# Patient Record
(demographics unavailable — no encounter records)

---

## 2024-10-16 NOTE — ASSESSMENT
[FreeTextEntry1] :  52-year-old female with past medical history of primary hyperparathyroidism status post parathyroidectomy 2023, osteoporosis (following endocrine), cervical and lumbar spine degenerative arthritis, cervical radiculopathy    Patient reports has been seeing ortho for low back pain and neck pain.  She reports since Summer 2024 she has been having pain in her hands and knees She also has pain in elbows, shoulders, toes   Both knees hurting ; worse with getting up and going down stairs  She has morning stiffness 30 min in hand knees, back  She has IBS- diarrhea and constipation  She has some canker sores on and off  She reports she had hives as a child; now its rare - once a year.  Reports dry eyes and dry mouth recently  Denies Raynaud's, joint inflammation, telangiectasias, skin thickening, dysphagia, calcinosis    Patient's work up shows  labs with +LEVON remaining serologes negative, and inflammation markers normal   Her hand u/s showed Trace scattered joint effusions on the right. Moderate midcarpal effusion with mild hyperemia. Findings are suggestive of mild inflammatory arthropathy. Mild to moderate tenosynovitis involving the extensor compartments bilaterally without hyperemia.   She has lumbar and cervical degenerative disease which explains her neck and back pain.  Given +LEVON, hand imaging showing mild inflammatory arthropathy, she might have undifferentiated connective tissue at this time Explained the LEVON centromere serology to patient in detail. At this point, she has no signs of limited systemic sclerosis, she had normal echo in 2023 per patient. Advised on symptoms to monitor for Meanwhile, will do trial of low-dose prednisone given mild inflammatory arthropathy seen on her hand ultrasound Prednisone 20 mg for 7 days followed by 50 mg for 7 days followed by 10 mg for 7 days then stop She will monitor her symptoms on prednisone and off the prednisone Further treatment based on symptoms after prednisone trial Follow up 3 month      Total time spent in review of patient history, clinical exam, management, counseling, and plan of care:  30min

## 2024-10-16 NOTE — HISTORY OF PRESENT ILLNESS
[FreeTextEntry1] : 52-year-old female with past medical history of primary hyperparathyroidism status post parathyroidectomy 2023, osteoporosis (following endocrine), cervical and lumbar spine degenerative arthritis      Coming in for evaluation of joint pain   Seen 9/2024; initial visit note:  Patient reports has been seeing ortho for low back pain and neck pain. She also has cervical radiculopathy  She reports for last 3-4 months has been having pain in her hands and knees   She also has pain in elbows, shoulders, toes but the worse is hands and knees  Before that she had mild on and off pain  Now difficulty opening jars, even toothpaste cap - worse with motion  Both knees hurting ; worse with getting up and going down stairs  She has morning stiffness 30 min in hand knees, back  She has IBS- diarrhea and constipation  She has some canker sores on and off  She reports she had hives as a child; now its rare - once a year.  Reports dry eyes and dry mouth recently  She is undergoing menopause.   Patient denies joint swelling, joint erythema/warmth, fatigue, fever, chills, weight loss, nasopharyngeal ulcers, chest pain, palpitations, cough, SOB, nausea, vomiting, blood in stool, dysuria, hematuria, rash, photosensitivity, Raynaud's, alopecia, dry eyes, dry mouth, dry skin, headaches, eye pain/redness, vision changes, myalgias, muscle weakness, dysphagia, miscarriages (2 early miscarriages), Hx of DVT/PEs.   Denies tophi, personal Hx of nephrolithiasis  Denies skin thickening ,severe heartburn, calcinosis, sinusitis.   Work up showed labs with +LEVON centromere pattern remaining serologies negative, and inflammation markers normal   Her hand u/s showed Trace scattered joint effusions on the right. Moderate midcarpal effusion with mild hyperemia. Findings are suggestive of mild inflammatory arthropathy. Mild to moderate tenosynovitis involving the extensor compartments bilaterally without hyperemia.      9/13/2024   LEVON 1: 320 centromere pattern  Normal/negative Palm, RNP, dsDNA, RF, CCP, C3, C4, RF, CCP, protein creatinine ratio urine\       Imaging   Xrays hand/wrist   IMPRESSION:   No fractures or dislocations.  Preserved joint spaces and no joint margin erosions.  Carpal bones normally aligned.  Bone islands in left 4th and 5th distal phalangeal tuft hips. No additional focal osseous lesions.       Xr knee   IMPRESSION:  No fractures, dislocations, or joint effusions.  Preserved joint spaces with smooth and intact articular surfaces. No joint margin erosions or intra-articular or periarticular calcifications.  Unremarkable quadriceps and patellar tendon shadows.  No destructive osseous lesions or periosteal reaction.           Hand u/s   IMPRESSION:   1.  Trace scattered joint effusions on the right. Moderate midcarpal effusion with mild hyperemia. Findings are suggestive of mild inflammatory arthropathy.   2.  Mild to moderate tenosynovitis involving the extensor compartments bilaterally without hyperemia.

## 2024-10-16 NOTE — PHYSICAL EXAM
[TextEntry] :     GENERAL: Appears in no acute distress HEENT: EOMI. No conjunctival erythema. Moist mucous membranes. NECK: Supple, no cervical lymphadenopathy CARDIOVASCULAR: RRR. S1, S2 auscultated. No murmur PULMONARY: Clear to auscultation b/l, no wheezes, ABDOMINAL: Soft, nontender, nondistended. MSK: No active synovitis, swelling, erythema, or warmth. No joint tenderness to palpation. No Bouchards or Heberdens nodes No deformities. Normal ROM of neck, back, b/l upper and lower extremities. No dactylitis, enthesitis, nail pitting SKIN: No lesions or rashes No sclerodactyly, telangiectasias, calcinosis. NEURO: No focal deficits. Motor strength 5/5 in major muscle groups of b/l UE and LE. Sensation to soft touch intact in major dermatomes of b/l UE and LE. PSYCH: AAOx3. Normal affect and thought process.

## 2025-01-15 NOTE — ASSESSMENT
[FreeTextEntry1] : Labs from 01/26/2023 indicated the patient had primary hyperparathyroidism - she underwent right superior parathyroidectomy on 04/12/2024 and she has been eucalcemic since then.  DXA scan in Feb 2023 revealed osteoporosis and she underwent parathyroidectomy in April 2023 - repeat DXA in May 2024 revealed significant improvement of the BMD at the Lumbar Spine (6% improvement) and at the left total hip (7% improvement) and no significant change in BMD at the left femoral neck but the patient still remained osteoporotic. Thus I prescribed alendronate on 07/18/2024 and patient started this in August 2024.    Plan: 1. Continue alendronate 70 mg po once weekly 2. Labs to be done in 1 year - CMP, PTH, 25 OH vitamin D, phosphorous level 3. Follow up in 1 year to review results - telehealth visit ok - the patient is a nurse at [employer].

## 2025-01-15 NOTE — PHYSICAL EXAM
[de-identified] : General: No distress, well nourished Eyes: Normal external appearance ENT: Normal appearance of the nose Neck/Thyroid: No visible neck swelling Respiratory: No use of accessory muscles of respiration Psychiatry: Patient converses normally, good judgement and insight Skin: No rashes seen on face

## 2025-01-15 NOTE — HISTORY OF PRESENT ILLNESS
[FreeTextEntry1] : Problems: 1. History of Primary hyperparathyroidism s/p parathyroidectomy in 2023 - patient eucalcemic now 2. Osteoporosis    History Primary hyperparathyroidism/Osteoporosis 1. Diagnosed with elevated calcium level in October 2022 and primary hyperparathyroidism in Jan 2023 2. Labs: 10/18/2021 - Calcium 10.8 (8.4 to 10.5), albumin 4.7, 25 OH vitamin D level 24.6, TSH normal 01/14/2023 - PTH 60 (15 to 65) 01/26/2023 - PTH 54 (15 to 65), 25 OH vitamin D 26.4, Cr 0.7 N, Calcium 11.2 (8.4 to 10.5), albumin 4.6, corrected calcium 10.72,  June 2023 - PTH N, 25 OH vitamin D - 34.5 November 2023 - Cr N, corrected calcium N June 2024 - 25 OH vitamin D 41.9, SPEP normal, serum phosphorous N, PTH 43 N, Cr N, Corrected calcium N 3. Radiology: 02/07/2023 - US thyroid - Right lobe measures 5.3 cm - there is a 0.4 cm lesion deep to the right lower pole and a 0.3 cm nodule deep to the lower left lobe.  02/07/2023 - DXA scan - L1 to L4  T score neg 3.4 with BMD of 0.672, Left total Hip T score neg 2.7 with BMD of 0.607, left femoral neck T score neg 2.2 with BMD of 0.604.  02/14/2023 - NM parathyroid scan - Parathyroid lesions seen in the right lower pole and left lower pole, there is possibly a right upper pole parathyroid lesion.  03/17/2023 - CT 4 D parathyroid - Parathyroid adenoma posterior to the right mid to lower thyroid lobe. 05/22/2024 - DXA - L1 to L4 BMD 0.711 with T score of neg 3.1, Left femoral neck BMD 0.601 with T score neg 2.2, Left total hip BMD 0.646 with T score neg 2.4, Single lateral view of the thoracolumbar spine (T10-L4)  demonstrates no evidence of vertebral compression deformity. 4. Surgical treatment: 04/12/2023 - right superior parathyroidectomy - pathology benign - patient became eucalcemic post operatively and PTH was normal 5. No kidney stones, Creatinine normal in October 2022, has osteoporosis (DXA scan in Feb 2023 revealed osteoporosis, patient never had a fracture - the patient still menstruates) 5. No family history of hyperparathyroidism or hypercalcemia 6. Patient sporadically drinks milk, she does not use yogurt, ice-cream or cheese On combination of calcium/vitamin D pill each pill contains 600 mg of calcium and 1000 units - uses one pill po bid.  7. Meds: Patient did not have treatment for osteoporosis prior to August 2024 NO UPCOMING DENTAL WORK PLANNED On alendronate 70 mg po once weekly - no side effects, started in August 2024

## 2025-01-15 NOTE — REASON FOR VISIT
[Other Location: e.g. School (Enter Location, City,State)___] : at [unfilled], at the time of the visit. [Medical Office: ([facility])___] : at the medical office located in  [Patient] : the patient [Follow - Up] : a follow-up visit [Osteoporosis] : osteoporosis

## 2025-01-16 NOTE — HISTORY OF PRESENT ILLNESS
[FreeTextEntry1] : 53-year-old female with past medical history of primary hyperparathyroidism status post parathyroidectomy 2023, osteoporosis (following endocrine), cervical and lumbar spine degenerative arthritis      Coming in for for follow up   Seen 9/2024; initial visit note:  Patient reports has been seeing ortho for low back pain and neck pain. She also has cervical radiculopathy  She reports  pain in her hands and knees. She also has pain in elbows, shoulders, toes but the worse is hands and knees  Now difficulty opening jars, even toothpaste cap - worse with motion  Both knees hurting ; worse with getting up and going down stairs  She has morning stiffness 30 min in hand knees, back  She has IBS- diarrhea and constipation  She has some canker sores on and off  She reports she had hives as a child; now its rare - once a year.  Reports dry eyes and dry mouth recently  She is undergoing menopause.   Patient denies joint swelling, joint erythema/warmth, fatigue, fever, chills, weight loss, nasopharyngeal ulcers, chest pain, palpitations, cough, SOB, nausea, vomiting, blood in stool, dysuria, hematuria, rash, photosensitivity, Raynaud's, alopecia, dry eyes, dry mouth, dry skin, headaches, eye pain/redness, vision changes, myalgias, muscle weakness, dysphagia, miscarriages (2 early miscarriages), Hx of DVT/PEs.   Denies tophi, personal Hx of nephrolithiasis  Denies skin thickening ,severe heartburn, calcinosis, sinusitis.    Work up showed labs with +LEVON centromere pattern remaining serologies negative, and inflammation markers normal  Her hand u/s showed Trace scattered joint effusions on the right. Moderate midcarpal effusion with mild hyperemia. Findings are suggestive of mild inflammatory arthropathy. Mild to moderate tenosynovitis involving the extensor compartments bilaterally without hyperemia.   Last visit: Did trial of prednisone which helped symptoms Plan was to monitor symptoms - continues to have on and off hand and knee pain  Denies Raynaud's, sclerodactyly, telangiectasia, dysphagia, calcinosis, rashes, shortness of breath, chest pain, fever, chills, cough   9/13/2024   LEVON 1: 320 centromere pattern  Normal/negative Palm, RNP, dsDNA, RF, CCP, C3, C4, RF, CCP, protein creatinine ratio urine      Imaging   Xrays hand/wrist  IMPRESSION:  No fractures or dislocations.  Preserved joint spaces and no joint margin erosions.  Carpal bones normally aligned.  Bone islands in left 4th and 5th distal phalangeal tuft hips. No additional focal osseous lesions.     Xr knee  IMPRESSION:  No fractures, dislocations, or joint effusions.  Preserved joint spaces with smooth and intact articular surfaces. No joint margin erosions or intra-articular or periarticular calcifications.  Unremarkable quadriceps and patellar tendon shadows.  No destructive osseous lesions or periosteal reaction.          Hand u/s  IMPRESSION:  1. Trace scattered joint effusions on the right. Moderate midcarpal effusion with mild hyperemia. Findings are suggestive of mild inflammatory arthropathy.  2. Mild to moderate tenosynovitis involving the extensor compartments bilaterally without hyperemia.

## 2025-01-16 NOTE — ASSESSMENT
[FreeTextEntry1] :  53-year-old female with past medical history of primary hyperparathyroidism status post parathyroidectomy 2023, osteoporosis (following endocrine), cervical and lumbar spine degenerative arthritis, cervical radiculopathy   Patient reports has been seeing ortho for low back pain and neck pain.  She reports since Summer 2024 she has been having pain in her hands and knees  She also has pain in elbows, shoulders, toes  Both knees hurting ; worse with getting up and going down stairs  She has morning stiffness 30 min in hand knees, back  She has IBS- diarrhea and constipation  She has some canker sores on and off  She reports she had hives as a child; now its rare - once a year.  Reports dry eyes and dry mouth recently  Denies Raynaud's, joint inflammation, telangiectasias, skin thickening, dysphagia, calcinosis   Patient's work up shows labs with +LEVON remaining serologies negative, and inflammation markers normal  Her hand u/s showed Trace scattered joint effusions on the right. Moderate midcarpal effusion with mild hyperemia. Findings are suggestive of mild inflammatory arthropathy. Mild to moderate tenosynovitis involving the extensor compartments bilaterally without hyperemia.   She has lumbar and cervical degenerative disease which explains her neck and back pain.   Given +LEVON, hand imaging showing mild inflammatory arthropathy, she might have undifferentiated connective tissue vs seronegative RA. Explained the LEVON centromere serology to patient in detail. No features of CREST. She reports she had an echo a few years ago. Advised getting repeat echo and doing PFTs.  Given inflammatory findings on imaging, will do trial of HCQ.  Patient agrees to this.  HCQ dosage is <5mg/kg/day or <400mg/day to minimize risk of retinal toxicity. 200mg daily Side effects of HCQ were discussed with the patient including but not limited to GI upset, retinal toxicity, QT prolongation. Discussed the importance of yearly ophthalmology evaluation. Labs now  Follow up 3 month  Total time spent in review of patient history, clinical exam, management, counseling, and plan of care:  30min

## 2025-01-16 NOTE — PHYSICAL EXAM
[TextEntry] : GENERAL: Appears in no acute distress HEENT: EOMI. No conjunctival erythema. Moist mucous membranes. NECK: Supple, no cervical lymphadenopathy CARDIOVASCULAR: RRR. S1, S2 auscultated. PULMONARY: Clear to auscultation b/l, no wheezes, ABDOMINAL: Soft, nontender, nondistended. MSK: No active synovitis, swelling, erythema, or warmth. No joint tenderness to palpation. No Bouchards or Heberdens nodes No deformities. Normal ROM of neck, back, b/l upper and lower extremities. No dactylitis, enthesitis, nail pitting SKIN: No lesions or rashes No sclerodactyly, telangiectasias, calcinosis. NEURO: No focal deficits. Motor strength 5/5 in major muscle groups of b/l UE and LE. Sensation to soft touch intact in major dermatomes of b/l UE and LE. PSYCH: AAOx3. Normal affect and thought process

## 2025-02-08 NOTE — HISTORY OF PRESENT ILLNESS
[Patient reported mammogram was normal] : Patient reported mammogram was normal [Patient reported PAP Smear was normal] : Patient reported PAP Smear was normal [Patient reported breast sonogram was normal] : Patient reported breast sonogram was normal [Patient reported bone density results were abnormal] : Patient reported bone density results were abnormal [N] : Patient does not use contraception [Y] : Patient is sexually active [No] : Patient does not have concerns regarding sex [TextBox_4] : 54 yo presents for annual  [Mammogramdate] : 4/26/24 [BreastSonogramDate] : 4/26/24 [PapSmeardate] : 1/22/24 [BoneDensityDate] : 5/22/24 [TextBox_37] : osteoporosis  [LMPDate] : 1/2023 [FreeTextEntry1] : 1/2023

## 2025-02-08 NOTE — PHYSICAL EXAM
[Chaperone Present] : A chaperone was present in the examining room during all aspects of the physical examination [Appropriately responsive] : appropriately responsive [Alert] : alert [No Acute Distress] : no acute distress [No Lymphadenopathy] : no lymphadenopathy [Oriented x3] : oriented x3 [Examination Of The Breasts] : a normal appearance [No Masses] : no breast masses were palpable [Labia Majora] : normal [Labia Minora] : normal [Normal] : normal [Uterine Adnexae] : normal [FreeTextEntry2] : SARA Reilly

## 2025-02-08 NOTE — DISCUSSION/SUMMARY
[FreeTextEntry1] :  GYN Annual evaluation today -pap smear sent TVS done today We discussed - Patient verbalized understanding of all explanations, and her questions were answered, and all concerns were addressed. Patient was screened for depression - no signs of clinical depression. PHQ-2 on file Rx given for mammogram and B sonogram RTO in 1 year for annual   I, Char osuna acting as scribe for Dr. Young. 02/03/2025

## 2025-02-08 NOTE — HISTORY OF PRESENT ILLNESS
[Patient reported mammogram was normal] : Patient reported mammogram was normal [Patient reported PAP Smear was normal] : Patient reported PAP Smear was normal [Patient reported breast sonogram was normal] : Patient reported breast sonogram was normal [Patient reported bone density results were abnormal] : Patient reported bone density results were abnormal [N] : Patient does not use contraception [Y] : Patient is sexually active [No] : Patient does not have concerns regarding sex [TextBox_4] : 52 yo presents for annual  [Mammogramdate] : 4/26/24 [BreastSonogramDate] : 4/26/24 [PapSmeardate] : 1/22/24 [BoneDensityDate] : 5/22/24 [TextBox_37] : osteoporosis  [LMPDate] : 1/2023 [FreeTextEntry1] : 1/2023

## 2025-04-23 NOTE — PHYSICAL EXAM
[No Acute Distress] : no acute distress [Well Nourished] : well nourished [PERRL] : pupils equal round and reactive to light [Normal Oropharynx] : the oropharynx was normal [No Lymphadenopathy] : no lymphadenopathy [Supple] : supple [Thyroid Normal, No Nodules] : the thyroid was normal and there were no nodules present [No Accessory Muscle Use] : no accessory muscle use [Clear to Auscultation] : lungs were clear to auscultation bilaterally [Regular Rhythm] : with a regular rhythm [Normal S1, S2] : normal S1 and S2 [No Murmur] : no murmur heard [No Edema] : there was no peripheral edema [Normal Appearance] : normal in appearance [No Nipple Discharge] : no nipple discharge [No Axillary Lymphadenopathy] : no axillary lymphadenopathy [Soft] : abdomen soft [Non Tender] : non-tender [Non-distended] : non-distended [No Masses] : no abdominal mass palpated [No HSM] : no HSM [Normal Bowel Sounds] : normal bowel sounds [Normal Supraclavicular Nodes] : no supraclavicular lymphadenopathy [Normal Axillary Nodes] : no axillary lymphadenopathy [Normal Posterior Cervical Nodes] : no posterior cervical lymphadenopathy [Normal Anterior Cervical Nodes] : no anterior cervical lymphadenopathy [Normal Affect] : the affect was normal [Normal Insight/Judgement] : insight and judgment were intact [de-identified] : b/l impacted cerumen

## 2025-04-23 NOTE — REVIEW OF SYSTEMS
[Negative] : Neurological [Nausea] : no nausea [Constipation] : no constipation [Diarrhea] : diarrhea [Vomiting] : no vomiting [Heartburn] : no heartburn [Melena] : no melena [FreeTextEntry9] : developed joint pain for which she saw rheum

## 2025-04-23 NOTE — ASSESSMENT
[Vaccines Reviewed] : Immunizations reviewed today. Please see immunization details in the vaccine log within the immunization flowsheet.  [FreeTextEntry1] : 53F c  history of primary hyperparathyroidism (1/2023) s/p R. superior parathyroidectomy , osteoporosis, GERD (EGD 2019), probable IBS on hyoscyamine prn, seronegative arthritis (2024) here for cpe   [facility] - check fasting bw   ecg performed during wellness exam to monitor for any cardiac condition - ecg nsr - no changes from 11/21/23 saw rheum who advised pt to f/u with cardiology  utd with gyn exam & pap smear due for screening mammogram - has script from gyn  utd with bone density - saw endo  advised FBSE with derm UTD with colonoscopy and egd in 2019 with GI - would recommend repeat in 7 years utd with dental  advised routine optho exam   rtc in 1year for cpe or prn

## 2025-04-23 NOTE — HEALTH RISK ASSESSMENT
[Patient reported mammogram was normal] : Patient reported mammogram was normal [Patient reported PAP Smear was normal] : Patient reported PAP Smear was normal [Patient reported colonoscopy was normal] : Patient reported colonoscopy was normal [HIV Test offered] : HIV Test offered [MammogramDate] : 5/24 [PapSmearDate] : 2/25 [ColonoscopyDate] : 5/19

## 2025-04-23 NOTE — HISTORY OF PRESENT ILLNESS
[FreeTextEntry1] :  cpe  [de-identified] :  diet: good exercise: "tries to"  in interval - saw rheum diagnosed with possible seronegative inflammatory arthritis - started on hydroxychloroquine x 1 month  GI - had had longstanding chronic abdominal discomfort at night - h as intermittent crampy LUQ pain, has sharp epigastric 5-6/10 pain that is sharp in nature at night  pain doesn't occur if she eats before 6 pm tried Pepcid int he past which didn't help pain is promptly relieved with hosycamine prn which she had been prescribed by GI in 2019 for above symptoms  had EGD in 2019 that showed gastritis had normal colonoscopy in 2019  states that abdominal pain has improved since 2019